# Patient Record
Sex: FEMALE | URBAN - METROPOLITAN AREA
[De-identification: names, ages, dates, MRNs, and addresses within clinical notes are randomized per-mention and may not be internally consistent; named-entity substitution may affect disease eponyms.]

---

## 2018-04-24 ENCOUNTER — EMERGENCY (EMERGENCY)
Facility: HOSPITAL | Age: 68
LOS: 1 days | Discharge: HOME | End: 2018-04-24
Attending: EMERGENCY MEDICINE | Admitting: EMERGENCY MEDICINE
Payer: SELF-PAY

## 2018-04-24 VITALS
WEIGHT: 149.91 LBS | HEIGHT: 66 IN | OXYGEN SATURATION: 98 % | RESPIRATION RATE: 18 BRPM | HEART RATE: 110 BPM | SYSTOLIC BLOOD PRESSURE: 155 MMHG | DIASTOLIC BLOOD PRESSURE: 82 MMHG | TEMPERATURE: 98 F

## 2018-04-24 VITALS
RESPIRATION RATE: 14 BRPM | TEMPERATURE: 98 F | DIASTOLIC BLOOD PRESSURE: 76 MMHG | SYSTOLIC BLOOD PRESSURE: 142 MMHG | OXYGEN SATURATION: 97 % | HEART RATE: 96 BPM

## 2018-04-24 PROCEDURE — 99284 EMERGENCY DEPT VISIT MOD MDM: CPT | Mod: 25

## 2018-04-24 PROCEDURE — 93971 EXTREMITY STUDY: CPT | Mod: 26,LT

## 2018-04-24 PROCEDURE — 93971 EXTREMITY STUDY: CPT

## 2018-04-24 PROCEDURE — 99285 EMERGENCY DEPT VISIT HI MDM: CPT

## 2018-04-24 RX ORDER — OXYCODONE AND ACETAMINOPHEN 5; 325 MG/1; MG/1
1 TABLET ORAL ONCE
Qty: 0 | Refills: 0 | Status: DISCONTINUED | OUTPATIENT
Start: 2018-04-24 | End: 2018-04-24

## 2018-04-24 RX ORDER — METHOCARBAMOL 500 MG/1
500 TABLET, FILM COATED ORAL ONCE
Qty: 0 | Refills: 0 | Status: COMPLETED | OUTPATIENT
Start: 2018-04-24 | End: 2018-04-24

## 2018-04-24 RX ORDER — METHOCARBAMOL 500 MG/1
1 TABLET, FILM COATED ORAL
Qty: 12 | Refills: 0 | OUTPATIENT
Start: 2018-04-24 | End: 2018-04-26

## 2018-04-24 RX ADMIN — METHOCARBAMOL 500 MILLIGRAM(S): 500 TABLET, FILM COATED ORAL at 09:07

## 2018-04-24 RX ADMIN — OXYCODONE AND ACETAMINOPHEN 1 TABLET(S): 5; 325 TABLET ORAL at 09:02

## 2018-04-24 RX ADMIN — OXYCODONE AND ACETAMINOPHEN 1 TABLET(S): 5; 325 TABLET ORAL at 09:59

## 2018-04-24 NOTE — ED PROVIDER NOTE - OBJECTIVE STATEMENT
66 y/o F with c/o 2 days of left leg pain.  Pt is visiting her daughter.  Pt states she was stepping out of the shower and felt a spasm in her left gluteus with radiation of pain down her leg.  pt c/o left calf pain. denies recent travel.

## 2023-12-19 NOTE — ED PROVIDER NOTE - CPE EDP RESP NORM
As addressed in HPI  Well-healed left craniotomy incision.  Continues with short-term memory loss., anxiety, complaint of intermittent headache  not resolved by triptan or steroid taper. .   Reports and as per record review multiple episodes of ED visits for migraines since initial neurosurgery visit 6/15/2023.  Neurology telemedicine visit 10/3/2023 with no indication when patient has a follow-up visit.    Patient reports an ED visit for breakthrough seizure which she was told by the ED staff it was mild.    Imaging  2/13/23 No acute intracranial pathology. 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  No neurosurgical pathology,      Plan  Reviewed [facility] imaging with patient. No findings that required neurosurgical intervention.  Ordered referral again  to neurology to advise recurrent ED visits since Sona 15, 2023 for migraine headaches and 1 for breakthrough  epilepsy as per patient Neurology contacted vis another referral  since our initial visit was 10/3/23 telemedicine, no indication when next f/u visit advised the patient need follow-up appointment for ongoing assessment and possible med regimen adjustment in the setting of recurrent migraine and breakthrough epilepsy.  Advised if she has additional questions or concerns call the office.        
normal...